# Patient Record
Sex: FEMALE | Race: WHITE | NOT HISPANIC OR LATINO | Employment: UNEMPLOYED | URBAN - NONMETROPOLITAN AREA
[De-identification: names, ages, dates, MRNs, and addresses within clinical notes are randomized per-mention and may not be internally consistent; named-entity substitution may affect disease eponyms.]

---

## 2017-04-25 LAB
EXTERNAL ABO GROUPING: NORMAL
EXTERNAL ANTIBODY SCREEN: NEGATIVE
EXTERNAL RH FACTOR: POSITIVE

## 2017-05-02 LAB
EXTERNAL CHLAMYDIA SCREEN: NEGATIVE
EXTERNAL GONORRHEA SCREEN: NEGATIVE
EXTERNAL HEPATITIS B SURFACE ANTIGEN: NEGATIVE
EXTERNAL HEPATITIS C AB: NEGATIVE
EXTERNAL HERPES PCR: NEGATIVE
EXTERNAL RUBELLA QUALITATIVE: NORMAL
EXTERNAL SYPHILIS RPR SCREEN: NORMAL
HIV1 P24 AG SERPL QL IA: NORMAL

## 2017-11-16 LAB — EXTERNAL GROUP B STREP ANTIGEN: NEGATIVE

## 2017-12-19 ENCOUNTER — APPOINTMENT (OUTPATIENT)
Dept: PREADMISSION TESTING | Facility: HOSPITAL | Age: 22
End: 2017-12-19

## 2017-12-19 VITALS
BODY MASS INDEX: 33.38 KG/M2 | RESPIRATION RATE: 18 BRPM | SYSTOLIC BLOOD PRESSURE: 125 MMHG | WEIGHT: 181.4 LBS | HEIGHT: 62 IN | OXYGEN SATURATION: 98 % | DIASTOLIC BLOOD PRESSURE: 81 MMHG | HEART RATE: 101 BPM

## 2017-12-19 DIAGNOSIS — O34.211 MATERNAL CARE DUE TO LOW TRANSVERSE UTERINE SCAR FROM PREVIOUS CESAREAN DELIVERY: ICD-10-CM

## 2017-12-19 DIAGNOSIS — O34.211 MATERNAL CARE DUE TO LOW TRANSVERSE UTERINE SCAR FROM PREVIOUS CESAREAN DELIVERY: Primary | ICD-10-CM

## 2017-12-19 LAB
ABO GROUP BLD: NORMAL
BASOPHILS # BLD AUTO: 0.02 10*3/MM3 (ref 0–0.2)
BASOPHILS NFR BLD AUTO: 0.2 % (ref 0–2)
BILIRUB UR QL STRIP: NEGATIVE
BLD GP AB SCN SERPL QL: NEGATIVE
CLARITY UR: CLEAR
COLOR UR: YELLOW
DEPRECATED RDW RBC AUTO: 38.4 FL (ref 40–54)
EOSINOPHIL # BLD AUTO: 0.2 10*3/MM3 (ref 0–0.7)
EOSINOPHIL NFR BLD AUTO: 2 % (ref 0–4)
ERYTHROCYTE [DISTWIDTH] IN BLOOD BY AUTOMATED COUNT: 13.4 % (ref 12–15)
GLUCOSE UR STRIP-MCNC: NEGATIVE MG/DL
HCT VFR BLD AUTO: 30.1 % (ref 37–47)
HGB BLD-MCNC: 9.9 G/DL (ref 12–16)
HGB UR QL STRIP.AUTO: NEGATIVE
IMM GRANULOCYTES # BLD: 0.11 10*3/MM3 (ref 0–0.03)
IMM GRANULOCYTES NFR BLD: 1.1 % (ref 0–5)
KETONES UR QL STRIP: NEGATIVE
LEUKOCYTE ESTERASE UR QL STRIP.AUTO: ABNORMAL
LYMPHOCYTES # BLD AUTO: 1.56 10*3/MM3 (ref 0.72–4.86)
LYMPHOCYTES NFR BLD AUTO: 15.7 % (ref 15–45)
MCH RBC QN AUTO: 26.4 PG (ref 28–32)
MCHC RBC AUTO-ENTMCNC: 32.9 G/DL (ref 33–36)
MCV RBC AUTO: 80.3 FL (ref 82–98)
MONOCYTES # BLD AUTO: 0.48 10*3/MM3 (ref 0.19–1.3)
MONOCYTES NFR BLD AUTO: 4.8 % (ref 4–12)
NEUTROPHILS # BLD AUTO: 7.54 10*3/MM3 (ref 1.87–8.4)
NEUTROPHILS NFR BLD AUTO: 76.2 % (ref 39–78)
NITRITE UR QL STRIP: NEGATIVE
NRBC BLD MANUAL-RTO: 0 /100 WBC (ref 0–0)
PH UR STRIP.AUTO: 8.5 [PH] (ref 5–8)
PLATELET # BLD AUTO: 274 10*3/MM3 (ref 130–400)
PMV BLD AUTO: 10.7 FL (ref 6–12)
PROT UR QL STRIP: NEGATIVE
RBC # BLD AUTO: 3.75 10*6/MM3 (ref 4.2–5.4)
RH BLD: POSITIVE
SP GR UR STRIP: 1.02 (ref 1–1.03)
UROBILINOGEN UR QL STRIP: ABNORMAL
WBC NRBC COR # BLD: 9.91 10*3/MM3 (ref 4.8–10.8)

## 2017-12-19 PROCEDURE — 86850 RBC ANTIBODY SCREEN: CPT | Performed by: OBSTETRICS & GYNECOLOGY

## 2017-12-19 PROCEDURE — 86901 BLOOD TYPING SEROLOGIC RH(D): CPT | Performed by: OBSTETRICS & GYNECOLOGY

## 2017-12-19 PROCEDURE — 81003 URINALYSIS AUTO W/O SCOPE: CPT | Performed by: OBSTETRICS & GYNECOLOGY

## 2017-12-19 PROCEDURE — 86900 BLOOD TYPING SEROLOGIC ABO: CPT | Performed by: OBSTETRICS & GYNECOLOGY

## 2017-12-19 PROCEDURE — 85025 COMPLETE CBC W/AUTO DIFF WBC: CPT | Performed by: OBSTETRICS & GYNECOLOGY

## 2017-12-19 PROCEDURE — 36415 COLL VENOUS BLD VENIPUNCTURE: CPT

## 2017-12-19 RX ORDER — METHYLERGONOVINE MALEATE 0.2 MG/ML
200 INJECTION INTRAVENOUS AS NEEDED
Status: CANCELLED | OUTPATIENT
Start: 2017-12-20

## 2017-12-19 RX ORDER — SODIUM CHLORIDE 0.9 % (FLUSH) 0.9 %
1-10 SYRINGE (ML) INJECTION AS NEEDED
Status: CANCELLED | OUTPATIENT
Start: 2017-12-20

## 2017-12-19 RX ORDER — DIPHENHYDRAMINE HCL 25 MG
25 CAPSULE ORAL EVERY 6 HOURS PRN
COMMUNITY
End: 2017-12-23 | Stop reason: HOSPADM

## 2017-12-19 RX ORDER — PROMETHAZINE HYDROCHLORIDE 25 MG/1
12.5 TABLET ORAL EVERY 6 HOURS PRN
Status: CANCELLED | OUTPATIENT
Start: 2017-12-20

## 2017-12-19 RX ORDER — ALBUTEROL SULFATE 90 UG/1
2 AEROSOL, METERED RESPIRATORY (INHALATION) EVERY 4 HOURS PRN
COMMUNITY

## 2017-12-19 RX ORDER — CARBOPROST TROMETHAMINE 250 UG/ML
250 INJECTION, SOLUTION INTRAMUSCULAR AS NEEDED
Status: CANCELLED | OUTPATIENT
Start: 2017-12-20

## 2017-12-19 RX ORDER — PROMETHAZINE HYDROCHLORIDE 25 MG/ML
12.5 INJECTION, SOLUTION INTRAMUSCULAR; INTRAVENOUS EVERY 6 HOURS PRN
Status: CANCELLED | OUTPATIENT
Start: 2017-12-20

## 2017-12-19 RX ORDER — CEFAZOLIN SODIUM 2 G/100ML
2 INJECTION, SOLUTION INTRAVENOUS ONCE
Status: CANCELLED | OUTPATIENT
Start: 2017-12-20

## 2017-12-19 RX ORDER — MISOPROSTOL 200 UG/1
800 TABLET ORAL AS NEEDED
Status: CANCELLED | OUTPATIENT
Start: 2017-12-20

## 2017-12-19 RX ORDER — OXYCODONE HYDROCHLORIDE AND ACETAMINOPHEN 5; 325 MG/1; MG/1
1 TABLET ORAL EVERY 4 HOURS PRN
Status: CANCELLED | OUTPATIENT
Start: 2017-12-20 | End: 2017-12-29

## 2017-12-19 RX ORDER — IBUPROFEN 800 MG/1
800 TABLET ORAL EVERY 8 HOURS PRN
Status: CANCELLED | OUTPATIENT
Start: 2017-12-20

## 2017-12-19 RX ORDER — PROMETHAZINE HYDROCHLORIDE 12.5 MG/1
12.5 SUPPOSITORY RECTAL EVERY 6 HOURS PRN
Status: CANCELLED | OUTPATIENT
Start: 2017-12-20

## 2017-12-19 NOTE — DISCHARGE INSTRUCTIONS
DAY OF SURGERY INSTRUCTIONS        YOUR SURGEON: dr kelly    PROCEDURE: *** SECTION REPEAT    DATE OF SURGERY: ***2017    ARRIVAL TIME: AS DIRECTED BY OFFICE    DAY OF SURGERY TAKE ONLY THESE MEDICATIONS: ***none        BEFORE YOU COME TO THE HOSPITAL  (Pre-op instructions)  • Do not eat, drink, smoke or chew gum after midnight the night before surgery.  This also includes no mints.  • Morning of surgery take only the medicines you have been instructed with a sip of water unless otherwise instructed  by your physician.  • Do not shave, wear makeup or dark nail polish.  • Remove all jewelry including rings.  • Leave anything you consider valuable at home.  • Leave your suitcase in the car until after your surgery.  • Bring the following with you if applicable:  o Picture ID and insurance, Medicare or Medicaid cards  o Co-pay/deductible required by insurance (cash, check, credit card)  o Copy of advance directive, living will or power-of- documents if not brought to PAT  o CPAP or BIPAP mask and tubing  o Relaxation aids (MP3 player, book, magazine)  • On the day of surgery check in at registration located at the main entrance of the hospital.       Outpatient Surgery Guidelines, Adult  Outpatient procedures are those for which the person having the procedure is allowed to go home the same day as the procedure. Various procedures are done on an outpatient basis. You should follow some general guidelines if you will be having an outpatient procedure.  LET YOUR HEALTH CARE PROVIDER KNOW ABOUT:  · Any allergies you have.  · All medicines you are taking, including vitamins, herbs, eye drops, creams, and over-the-counter medicines.  · Previous problems you or members of your family have had with the use of anesthetics.  · Any blood disorders you have.  · Previous surgeries you have had.  · Medical conditions you have.  RISKS AND COMPLICATIONS  Your health care provider will discuss possible risks  and complications with you before surgery. Common risks and complications include:    · Problems due to the use of anesthetics.  · Blood loss and replacement (does not apply to minor surgical procedures).  · Temporary increase in pain due to surgery.  · Uncorrected pain or problems that the surgery was meant to correct.  · Infection.  · New damage.  BEFORE THE PROCEDURE  · Ask your health care provider about changing or stopping your regular medicines. You may need to stop taking certain medicines in the days or weeks before the procedure.  · Stop smoking at least 2 weeks before surgery. This lowers your risk for complications during and after surgery. Ask your health care provider for help with this if needed.  · Eat your usual meals and a light supper the day before surgery. Continue fluid intake. Do not drink alcohol.  · Do not eat or drink after midnight the night before your surgery.   · Arrange for someone to take you home and to stay with you for 24 hours after the procedure. Medicine given for your procedure may affect your ability to drive or to care for yourself.  · Call your health care provider's office if you develop an illness or problem that may prevent you from safely having your procedure.  AFTER THE PROCEDURE  After surgery, you will be taken to a recovery area, where your progress will be monitored. If there are no complications, you will be allowed to go home when you are awake, stable, and taking fluids well. You may have numbness around the surgical site. Healing will take some time. You will have tenderness at the surgical site and may have some swelling and bruising. You may also have some nausea.  HOME CARE INSTRUCTIONS  · Do not drive for 24 hours, or as directed by your health care provider. Do not drive while taking prescription pain medicines.  · Do not drink alcohol for 24 hours.  · Do not make important decisions or sign legal documents for 24 hours.  · You may resume a normal diet and  activities as directed.  · Do not lift anything heavier than 10 pounds (4.5 kg) or play contact sports until your health care provider says it is okay.  · Change your bandages (dressings) as directed.  · Only take over-the-counter or prescription medicines as directed by your health care provider.  · Follow up with your health care provider as directed.  SEEK MEDICAL CARE IF:  · You have increased bleeding (more than a small spot) from the surgical site.  · You have redness, swelling, or increasing pain in the wound.  · You see pus coming from the wound.  · You have a fever.  · You notice a bad smell coming from the wound or dressing.  · You feel lightheaded or faint.  · You develop a rash.  · You have trouble breathing.  · You develop allergies.  MAKE SURE YOU:  · Understand these instructions.  · Will watch your condition.  · Will get help right away if you are not doing well or get worse.     This information is not intended to replace advice given to you by your health care provider. Make sure you discuss any questions you have with your health care provider.     Document Released: 09/12/2002 Document Revised: 05/03/2016 Document Reviewed: 05/22/2014  [employer] Interactive Patient Education ©2016 [employer]       Fall Prevention in Hospitals, Adult  As a hospital patient, your condition and the treatments you receive can increase your risk for falls. Some additional risk factors for falls in a hospital include:  · Being in an unfamiliar environment.  · Being on bed rest.  · Your surgery.  · Taking certain medicines.  · Your tubing requirements, such as intravenous (IV) therapy or catheters.  It is important that you learn how to decrease fall risks while at the hospital. Below are important tips that can help prevent falls.  SAFETY TIPS FOR PREVENTING FALLS  Talk about your risk of falling.  · Ask your health care provider why you are at risk for falling. Is it your medicine, illness, tubing placement, or  something else?  · Make a plan with your health care provider to keep you safe from falls.  · Ask your health care provider or pharmacist about side effects of your medicines. Some medicines can make you dizzy or affect your coordination.  Ask for help.  · Ask for help before getting out of bed. You may need to press your call button.  · Ask for assistance in getting safely to the toilet.  · Ask for a walker or cane to be put at your bedside. Ask that most of the side rails on your bed be placed up before your health care provider leaves the room.  · Ask family or friends to sit with you.  · Ask for things that are out of your reach, such as your glasses, hearing aids, telephone, bedside table, or call button.  Follow these tips to avoid falling:  · Stay lying or seated, rather than standing, while waiting for help.  · Wear rubber-soled slippers or shoes whenever you walk in the hospital.  · Avoid quick, sudden movements.  ¨ Change positions slowly.  ¨ Sit on the side of your bed before standing.  ¨ Stand up slowly and wait before you start to walk.  · Let your health care provider know if there is a spill on the floor.  · Pay careful attention to the medical equipment, electrical cords, and tubes around you.  · When you need help, use your call button by your bed or in the bathroom. Wait for one of your health care providers to help you.  · If you feel dizzy or unsure of your footing, return to bed and wait for assistance.  · Avoid being distracted by the TV, telephone, or another person in your room.  · Do not lean or support yourself on rolling objects, such as IV poles or bedside tables.     This information is not intended to replace advice given to you by your health care provider. Make sure you discuss any questions you have with your health care provider.     Document Released: 12/15/2001 Document Revised: 01/08/2016 Document Reviewed: 08/25/2013  [employer] Interactive Patient Education ©2016 [employer].       Surgical Site Infections FAQs  What is a Surgical Site Infection (SSI)?  A surgical site infection is an infection that occurs after surgery in the part of the body where the surgery took place. Most patients who have surgery do not develop an infection. However, infections develop in about 1 to 3 out of every 100 patients who have surgery.  Some of the common symptoms of a surgical site infection are:  · Redness and pain around the area where you had surgery  · Drainage of cloudy fluid from your surgical wound  · Fever  Can SSIs be treated?  Yes. Most surgical site infections can be treated with antibiotics. The antibiotic given to you depends on the bacteria (germs) causing the infection. Sometimes patients with SSIs also need another surgery to treat the infection.  What are some of the things that hospitals are doing to prevent SSIs?  To prevent SSIs, doctors, nurses, and other healthcare providers:  · Clean their hands and arms up to their elbows with an antiseptic agent just before the surgery.  · Clean their hands with soap and water or an alcohol-based hand rub before and after caring for each patient.  · May remove some of your hair immediately before your surgery using electric clippers if the hair is in the same area where the procedure will occur. They should not shave you with a razor.  · Wear special hair covers, masks, gowns, and gloves during surgery to keep the surgery area clean.  · Give you antibiotics before your surgery starts. In most cases, you should get antibiotics within 60 minutes before the surgery starts and the antibiotics should be stopped within 24 hours after surgery.  · Clean the skin at the site of your surgery with a special soap that kills germs.  What can I do to help prevent SSIs?  Before your surgery:  · Tell your doctor about other medical problems you may have. Health problems such as allergies, diabetes, and obesity could affect your surgery and your  treatment.  · Quit smoking. Patients who smoke get more infections. Talk to your doctor about how you can quit before your surgery.  · Do not shave near where you will have surgery. Shaving with a razor can irritate your skin and make it easier to develop an infection.  At the time of your surgery:  · Speak up if someone tries to shave you with a razor before surgery. Ask why you need to be shaved and talk with your surgeon if you have any concerns.  · Ask if you will get antibiotics before surgery.  After your surgery:  · Make sure that your healthcare providers clean their hands before examining you, either with soap and water or an alcohol-based hand rub.  · If you do not see your providers clean their hands, please ask them to do so.  · Family and friends who visit you should not touch the surgical wound or dressings.  · Family and friends should clean their hands with soap and water or an alcohol-based hand rub before and after visiting you. If you do not see them clean their hands, ask them to clean their hands.  What do I need to do when I go home from the hospital?  · Before you go home, your doctor or nurse should explain everything you need to know about taking care of your wound. Make sure you understand how to care for your wound before you leave the hospital.  · Always clean your hands before and after caring for your wound.  · Before you go home, make sure you know who to contact if you have questions or problems after you get home.  · If you have any symptoms of an infection, such as redness and pain at the surgery site, drainage, or fever, call your doctor immediately.  If you have additional questions, please ask your doctor or nurse.  Developed and co-sponsored by The Society for Healthcare Epidemiology of [place] (SHEA); Infectious Diseases Society of [place] (IDSA); American Hospital Association; Association for Professionals in Infection Control and Epidemiology (APIC); Centers for Disease  Control and Prevention (CDC); and The Joint Commission.     This information is not intended to replace advice given to you by your health care provider. Make sure you discuss any questions you have with your health care provider.     Document Released: 12/23/2014 Document Revised: 01/08/2016 Document Reviewed: 03/02/2016  [employer] Interactive Patient Education ©2016 [employer] Inc.       [facility]  CHG 4% Patient Instruction Sheet    Preparing the Skin Before Surgery  Preparing or “prepping” skin before surgery can reduce the risk of infection at the surgical site. To make the process easier,[facility] has chosen 4% Chlorhexidine Gluconate (CHG) antiseptic solution.   The steps below outline the prepping process and should be carefully followed.                                                                                                                                                      Prep the skin at the following time(s):                                                      We recommend you shower the night before surgery, and again the morning of surgery with the 4% CHG antiseptic solution using half of the bottle and a cloth each time.  Dress in clean clothes/sleepwear after showering.  See instructions below for application.          Do not apply any lotions or moisturizers.       Do not shave the area to be prepped for at least 2 days prior to surgery.    Clipping the hair may be done immediately prior to your surgery at the hospital    if needed.    Directions:  Thoroughly rinse your body with water.  Apply 4% CHG to a cloth and wash skin gently, paying special attention to the operative site.  Rinse again thoroughly.  Once you have begun using this product do not apply anything else to your skin. If itching or redness persists, rinse affected areas and discontinue use.    When using this product:  • Keep out of eyes, ears, and mouth.  • If solution should contact these areas, rinse out promptly  and thoroughly with water.  • For external use only.  • Do not use in genital area, on your face or head.      PATIENT/FAMILY/RESPONSIBLE PARTY VERBALIZES UNDERSTANDING OF ABOVE EDUCATION.  COPY OF PAIN SCALE GIVEN AND REVIEWED WITH VERBALIZED UNDERSTANDING.

## 2017-12-20 ENCOUNTER — ANESTHESIA EVENT (OUTPATIENT)
Dept: LABOR AND DELIVERY | Facility: HOSPITAL | Age: 22
End: 2017-12-20

## 2017-12-20 ENCOUNTER — ANESTHESIA (OUTPATIENT)
Dept: LABOR AND DELIVERY | Facility: HOSPITAL | Age: 22
End: 2017-12-20

## 2017-12-20 ENCOUNTER — HOSPITAL ENCOUNTER (INPATIENT)
Facility: HOSPITAL | Age: 22
LOS: 3 days | Discharge: HOME | End: 2017-12-23
Attending: OBSTETRICS & GYNECOLOGY | Admitting: OBSTETRICS & GYNECOLOGY

## 2017-12-20 DIAGNOSIS — Z3A.39 39 WEEKS GESTATION OF PREGNANCY: ICD-10-CM

## 2017-12-20 PROBLEM — O34.211 MATERNAL CARE DUE TO LOW TRANSVERSE UTERINE SCAR FROM PREVIOUS CESAREAN DELIVERY: Status: ACTIVE | Noted: 2017-12-20

## 2017-12-20 PROCEDURE — 25010000002 CEFEPIME PER 500 MG: Performed by: OBSTETRICS & GYNECOLOGY

## 2017-12-20 PROCEDURE — 25010000002 KETOROLAC TROMETHAMINE PER 15 MG: Performed by: NURSE ANESTHETIST, CERTIFIED REGISTERED

## 2017-12-20 PROCEDURE — C1765 ADHESION BARRIER: HCPCS | Performed by: OBSTETRICS & GYNECOLOGY

## 2017-12-20 PROCEDURE — 25010000002 DEXAMETHASONE PER 1 MG: Performed by: NURSE ANESTHETIST, CERTIFIED REGISTERED

## 2017-12-20 PROCEDURE — 25010000002 ONDANSETRON PER 1 MG: Performed by: OBSTETRICS & GYNECOLOGY

## 2017-12-20 PROCEDURE — 94799 UNLISTED PULMONARY SVC/PX: CPT

## 2017-12-20 PROCEDURE — 25010000002 ONDANSETRON PER 1 MG: Performed by: NURSE ANESTHETIST, CERTIFIED REGISTERED

## 2017-12-20 PROCEDURE — 94760 N-INVAS EAR/PLS OXIMETRY 1: CPT

## 2017-12-20 PROCEDURE — 25010000003 CEFAZOLIN PER 500 MG: Performed by: OBSTETRICS & GYNECOLOGY

## 2017-12-20 PROCEDURE — 88307 TISSUE EXAM BY PATHOLOGIST: CPT | Performed by: OBSTETRICS & GYNECOLOGY

## 2017-12-20 PROCEDURE — 25010000002 HYDROMORPHONE PER 4 MG: Performed by: NURSE ANESTHETIST, CERTIFIED REGISTERED

## 2017-12-20 PROCEDURE — 25010000002 KETOROLAC TROMETHAMINE PER 15 MG: Performed by: OBSTETRICS & GYNECOLOGY

## 2017-12-20 PROCEDURE — 51703 INSERT BLADDER CATH COMPLEX: CPT

## 2017-12-20 PROCEDURE — 25010000002 FUROSEMIDE PER 20 MG

## 2017-12-20 RX ORDER — PROMETHAZINE HYDROCHLORIDE 25 MG/ML
12.5 INJECTION, SOLUTION INTRAMUSCULAR; INTRAVENOUS EVERY 6 HOURS PRN
Status: DISCONTINUED | OUTPATIENT
Start: 2017-12-20 | End: 2017-12-20 | Stop reason: HOSPADM

## 2017-12-20 RX ORDER — SODIUM CHLORIDE, SODIUM LACTATE, POTASSIUM CHLORIDE, CALCIUM CHLORIDE 600; 310; 30; 20 MG/100ML; MG/100ML; MG/100ML; MG/100ML
125 INJECTION, SOLUTION INTRAVENOUS CONTINUOUS
Status: DISCONTINUED | OUTPATIENT
Start: 2017-12-20 | End: 2017-12-23 | Stop reason: HOSPADM

## 2017-12-20 RX ORDER — FUROSEMIDE 10 MG/ML
INJECTION INTRAMUSCULAR; INTRAVENOUS
Status: COMPLETED
Start: 2017-12-20 | End: 2017-12-20

## 2017-12-20 RX ORDER — BUPIVACAINE HYDROCHLORIDE 7.5 MG/ML
INJECTION, SOLUTION EPIDURAL; RETROBULBAR AS NEEDED
Status: DISCONTINUED | OUTPATIENT
Start: 2017-12-20 | End: 2017-12-20 | Stop reason: SURG

## 2017-12-20 RX ORDER — OXYCODONE AND ACETAMINOPHEN 7.5; 325 MG/1; MG/1
2 TABLET ORAL EVERY 4 HOURS PRN
Status: DISPENSED | OUTPATIENT
Start: 2017-12-20 | End: 2017-12-21

## 2017-12-20 RX ORDER — PRENATAL VIT/IRON FUM/FOLIC AC 27MG-0.8MG
1 TABLET ORAL DAILY
Status: DISCONTINUED | OUTPATIENT
Start: 2017-12-20 | End: 2017-12-23 | Stop reason: HOSPADM

## 2017-12-20 RX ORDER — SODIUM CHLORIDE 0.9 % (FLUSH) 0.9 %
1-10 SYRINGE (ML) INJECTION AS NEEDED
Status: DISCONTINUED | OUTPATIENT
Start: 2017-12-20 | End: 2017-12-20 | Stop reason: HOSPADM

## 2017-12-20 RX ORDER — BISACODYL 5 MG/1
10 TABLET, DELAYED RELEASE ORAL DAILY PRN
Status: DISCONTINUED | OUTPATIENT
Start: 2017-12-20 | End: 2017-12-23 | Stop reason: HOSPADM

## 2017-12-20 RX ORDER — SODIUM CHLORIDE 0.9 % (FLUSH) 0.9 %
1-10 SYRINGE (ML) INJECTION AS NEEDED
Status: DISCONTINUED | OUTPATIENT
Start: 2017-12-20 | End: 2017-12-23 | Stop reason: HOSPADM

## 2017-12-20 RX ORDER — OXYCODONE HYDROCHLORIDE AND ACETAMINOPHEN 5; 325 MG/1; MG/1
1 TABLET ORAL EVERY 6 HOURS PRN
Status: DISCONTINUED | OUTPATIENT
Start: 2017-12-21 | End: 2017-12-23 | Stop reason: HOSPADM

## 2017-12-20 RX ORDER — ONDANSETRON 4 MG/1
4 TABLET, FILM COATED ORAL EVERY 6 HOURS PRN
Status: DISCONTINUED | OUTPATIENT
Start: 2017-12-20 | End: 2017-12-23 | Stop reason: HOSPADM

## 2017-12-20 RX ORDER — OXYCODONE AND ACETAMINOPHEN 7.5; 325 MG/1; MG/1
1 TABLET ORAL EVERY 4 HOURS PRN
Status: ACTIVE | OUTPATIENT
Start: 2017-12-20 | End: 2017-12-21

## 2017-12-20 RX ORDER — OXYTOCIN 10 [USP'U]/ML
INJECTION, SOLUTION INTRAMUSCULAR; INTRAVENOUS AS NEEDED
Status: DISCONTINUED | OUTPATIENT
Start: 2017-12-20 | End: 2017-12-20 | Stop reason: SURG

## 2017-12-20 RX ORDER — SODIUM CHLORIDE, SODIUM LACTATE, POTASSIUM CHLORIDE, CALCIUM CHLORIDE 600; 310; 30; 20 MG/100ML; MG/100ML; MG/100ML; MG/100ML
INJECTION, SOLUTION INTRAVENOUS CONTINUOUS PRN
Status: DISCONTINUED | OUTPATIENT
Start: 2017-12-20 | End: 2017-12-20 | Stop reason: SURG

## 2017-12-20 RX ORDER — NALBUPHINE HCL 10 MG/ML
2.5 AMPUL (ML) INJECTION EVERY 4 HOURS PRN
Status: ACTIVE | OUTPATIENT
Start: 2017-12-20 | End: 2017-12-21

## 2017-12-20 RX ORDER — PHENYLEPHRINE HCL IN 0.9% NACL 0.8MG/10ML
SYRINGE (ML) INTRAVENOUS AS NEEDED
Status: DISCONTINUED | OUTPATIENT
Start: 2017-12-20 | End: 2017-12-20 | Stop reason: SURG

## 2017-12-20 RX ORDER — SENNA AND DOCUSATE SODIUM 50; 8.6 MG/1; MG/1
1 TABLET, FILM COATED ORAL NIGHTLY
Status: DISCONTINUED | OUTPATIENT
Start: 2017-12-20 | End: 2017-12-23 | Stop reason: HOSPADM

## 2017-12-20 RX ORDER — DOCUSATE SODIUM 100 MG/1
100 CAPSULE, LIQUID FILLED ORAL 2 TIMES DAILY PRN
Status: DISCONTINUED | OUTPATIENT
Start: 2017-12-20 | End: 2017-12-23 | Stop reason: HOSPADM

## 2017-12-20 RX ORDER — ONDANSETRON 4 MG/1
4 TABLET, FILM COATED ORAL EVERY 8 HOURS PRN
COMMUNITY
End: 2017-12-23 | Stop reason: HOSPADM

## 2017-12-20 RX ORDER — TRISODIUM CITRATE DIHYDRATE AND CITRIC ACID MONOHYDRATE 500; 334 MG/5ML; MG/5ML
15 SOLUTION ORAL ONCE
Status: COMPLETED | OUTPATIENT
Start: 2017-12-20 | End: 2017-12-20

## 2017-12-20 RX ORDER — ONDANSETRON 2 MG/ML
INJECTION INTRAMUSCULAR; INTRAVENOUS AS NEEDED
Status: DISCONTINUED | OUTPATIENT
Start: 2017-12-20 | End: 2017-12-20 | Stop reason: SURG

## 2017-12-20 RX ORDER — FERROUS SULFATE 325(65) MG
325 TABLET ORAL
COMMUNITY

## 2017-12-20 RX ORDER — OXYCODONE HYDROCHLORIDE AND ACETAMINOPHEN 5; 325 MG/1; MG/1
2 TABLET ORAL EVERY 6 HOURS PRN
Status: DISCONTINUED | OUTPATIENT
Start: 2017-12-21 | End: 2017-12-23 | Stop reason: HOSPADM

## 2017-12-20 RX ORDER — IBUPROFEN 800 MG/1
800 TABLET ORAL EVERY 8 HOURS PRN
Status: DISCONTINUED | OUTPATIENT
Start: 2017-12-21 | End: 2017-12-23 | Stop reason: HOSPADM

## 2017-12-20 RX ORDER — CARBOPROST TROMETHAMINE 250 UG/ML
250 INJECTION, SOLUTION INTRAMUSCULAR
Status: ACTIVE | OUTPATIENT
Start: 2017-12-20 | End: 2017-12-21

## 2017-12-20 RX ORDER — KETOROLAC TROMETHAMINE 30 MG/ML
INJECTION, SOLUTION INTRAMUSCULAR; INTRAVENOUS AS NEEDED
Status: DISCONTINUED | OUTPATIENT
Start: 2017-12-20 | End: 2017-12-20 | Stop reason: SURG

## 2017-12-20 RX ORDER — BISACODYL 10 MG
10 SUPPOSITORY, RECTAL RECTAL DAILY PRN
Status: DISCONTINUED | OUTPATIENT
Start: 2017-12-20 | End: 2017-12-23 | Stop reason: HOSPADM

## 2017-12-20 RX ORDER — IBUPROFEN 800 MG/1
800 TABLET ORAL EVERY 8 HOURS PRN
Status: DISCONTINUED | OUTPATIENT
Start: 2017-12-20 | End: 2017-12-20 | Stop reason: HOSPADM

## 2017-12-20 RX ORDER — FUROSEMIDE 10 MG/ML
10 INJECTION INTRAMUSCULAR; INTRAVENOUS ONCE
Status: COMPLETED | OUTPATIENT
Start: 2017-12-20 | End: 2017-12-20

## 2017-12-20 RX ORDER — OXYCODONE HYDROCHLORIDE AND ACETAMINOPHEN 5; 325 MG/1; MG/1
1 TABLET ORAL EVERY 4 HOURS PRN
Status: DISCONTINUED | OUTPATIENT
Start: 2017-12-20 | End: 2017-12-20 | Stop reason: HOSPADM

## 2017-12-20 RX ORDER — FAMOTIDINE 10 MG/ML
20 INJECTION, SOLUTION INTRAVENOUS ONCE AS NEEDED
Status: COMPLETED | OUTPATIENT
Start: 2017-12-20 | End: 2017-12-20

## 2017-12-20 RX ORDER — PROMETHAZINE HYDROCHLORIDE 12.5 MG/1
12.5 SUPPOSITORY RECTAL EVERY 6 HOURS PRN
Status: DISCONTINUED | OUTPATIENT
Start: 2017-12-20 | End: 2017-12-20 | Stop reason: HOSPADM

## 2017-12-20 RX ORDER — ONDANSETRON 2 MG/ML
4 INJECTION INTRAMUSCULAR; INTRAVENOUS EVERY 6 HOURS PRN
Status: DISCONTINUED | OUTPATIENT
Start: 2017-12-20 | End: 2017-12-20 | Stop reason: SDUPTHER

## 2017-12-20 RX ORDER — MISOPROSTOL 200 UG/1
800 TABLET ORAL AS NEEDED
Status: DISCONTINUED | OUTPATIENT
Start: 2017-12-20 | End: 2017-12-20 | Stop reason: HOSPADM

## 2017-12-20 RX ORDER — BUTORPHANOL TARTRATE 1 MG/ML
0.5 INJECTION, SOLUTION INTRAMUSCULAR; INTRAVENOUS EVERY 6 HOURS PRN
Status: ACTIVE | OUTPATIENT
Start: 2017-12-20 | End: 2017-12-21

## 2017-12-20 RX ORDER — DEXAMETHASONE SODIUM PHOSPHATE 4 MG/ML
INJECTION, SOLUTION INTRA-ARTICULAR; INTRALESIONAL; INTRAMUSCULAR; INTRAVENOUS; SOFT TISSUE AS NEEDED
Status: DISCONTINUED | OUTPATIENT
Start: 2017-12-20 | End: 2017-12-20 | Stop reason: SURG

## 2017-12-20 RX ORDER — METHYLERGONOVINE MALEATE 0.2 MG/ML
200 INJECTION INTRAVENOUS AS NEEDED
Status: DISCONTINUED | OUTPATIENT
Start: 2017-12-20 | End: 2017-12-20 | Stop reason: HOSPADM

## 2017-12-20 RX ORDER — ONDANSETRON 2 MG/ML
4 INJECTION INTRAMUSCULAR; INTRAVENOUS EVERY 6 HOURS PRN
Status: DISCONTINUED | OUTPATIENT
Start: 2017-12-20 | End: 2017-12-23 | Stop reason: HOSPADM

## 2017-12-20 RX ORDER — PROMETHAZINE HYDROCHLORIDE 25 MG/1
25 TABLET ORAL EVERY 6 HOURS PRN
Status: DISCONTINUED | OUTPATIENT
Start: 2017-12-20 | End: 2017-12-23 | Stop reason: HOSPADM

## 2017-12-20 RX ORDER — PROMETHAZINE HYDROCHLORIDE 25 MG/1
12.5 TABLET ORAL EVERY 6 HOURS PRN
Status: DISCONTINUED | OUTPATIENT
Start: 2017-12-20 | End: 2017-12-20 | Stop reason: HOSPADM

## 2017-12-20 RX ORDER — SODIUM CHLORIDE, SODIUM LACTATE, POTASSIUM CHLORIDE, CALCIUM CHLORIDE 600; 310; 30; 20 MG/100ML; MG/100ML; MG/100ML; MG/100ML
999 INJECTION, SOLUTION INTRAVENOUS CONTINUOUS
Status: DISCONTINUED | OUTPATIENT
Start: 2017-12-20 | End: 2017-12-20

## 2017-12-20 RX ORDER — KETOROLAC TROMETHAMINE 30 MG/ML
30 INJECTION, SOLUTION INTRAMUSCULAR; INTRAVENOUS EVERY 6 HOURS
Status: DISPENSED | OUTPATIENT
Start: 2017-12-20 | End: 2017-12-21

## 2017-12-20 RX ORDER — METHYLERGONOVINE MALEATE 0.2 MG/ML
200 INJECTION INTRAVENOUS
Status: ACTIVE | OUTPATIENT
Start: 2017-12-20 | End: 2017-12-21

## 2017-12-20 RX ORDER — ONDANSETRON 4 MG/1
4 TABLET, ORALLY DISINTEGRATING ORAL EVERY 6 HOURS PRN
Status: DISCONTINUED | OUTPATIENT
Start: 2017-12-20 | End: 2017-12-23 | Stop reason: HOSPADM

## 2017-12-20 RX ORDER — MISOPROSTOL 200 UG/1
800 TABLET ORAL ONCE AS NEEDED
Status: ACTIVE | OUTPATIENT
Start: 2017-12-20 | End: 2017-12-21

## 2017-12-20 RX ORDER — PROMETHAZINE HYDROCHLORIDE 25 MG/ML
12.5 INJECTION, SOLUTION INTRAMUSCULAR; INTRAVENOUS EVERY 6 HOURS PRN
Status: DISCONTINUED | OUTPATIENT
Start: 2017-12-20 | End: 2017-12-23 | Stop reason: HOSPADM

## 2017-12-20 RX ORDER — CARBOPROST TROMETHAMINE 250 UG/ML
250 INJECTION, SOLUTION INTRAMUSCULAR AS NEEDED
Status: DISCONTINUED | OUTPATIENT
Start: 2017-12-20 | End: 2017-12-20 | Stop reason: HOSPADM

## 2017-12-20 RX ORDER — NALOXONE HCL 0.4 MG/ML
0.4 VIAL (ML) INJECTION
Status: ACTIVE | OUTPATIENT
Start: 2017-12-20 | End: 2017-12-21

## 2017-12-20 RX ORDER — PROMETHAZINE HYDROCHLORIDE 12.5 MG/1
12.5 SUPPOSITORY RECTAL EVERY 6 HOURS PRN
Status: DISCONTINUED | OUTPATIENT
Start: 2017-12-20 | End: 2017-12-23 | Stop reason: HOSPADM

## 2017-12-20 RX ADMIN — FUROSEMIDE 10 MG: 10 INJECTION INTRAMUSCULAR; INTRAVENOUS at 12:49

## 2017-12-20 RX ADMIN — SODIUM CHLORIDE, POTASSIUM CHLORIDE, SODIUM LACTATE AND CALCIUM CHLORIDE 999 ML/HR: 600; 310; 30; 20 INJECTION, SOLUTION INTRAVENOUS at 11:29

## 2017-12-20 RX ADMIN — DEXAMETHASONE SODIUM PHOSPHATE 8 MG: 4 INJECTION, SOLUTION INTRAMUSCULAR; INTRAVENOUS at 07:50

## 2017-12-20 RX ADMIN — HYDROMORPHONE HYDROCHLORIDE 100 MCG: 1 INJECTION, SOLUTION INTRAMUSCULAR; INTRAVENOUS; SUBCUTANEOUS at 07:55

## 2017-12-20 RX ADMIN — SODIUM CHLORIDE, POTASSIUM CHLORIDE, SODIUM LACTATE AND CALCIUM CHLORIDE: 600; 310; 30; 20 INJECTION, SOLUTION INTRAVENOUS at 07:22

## 2017-12-20 RX ADMIN — CEFAZOLIN SODIUM 2 G: 2 SOLUTION INTRAVENOUS at 07:47

## 2017-12-20 RX ADMIN — SODIUM CHLORIDE, POTASSIUM CHLORIDE, SODIUM LACTATE AND CALCIUM CHLORIDE: 600; 310; 30; 20 INJECTION, SOLUTION INTRAVENOUS at 08:17

## 2017-12-20 RX ADMIN — BUPIVACAINE HYDROCHLORIDE 1.5 ML: 7.5 INJECTION, SOLUTION EPIDURAL; RETROBULBAR at 07:55

## 2017-12-20 RX ADMIN — HYDROMORPHONE HYDROCHLORIDE 400 MCG: 1 INJECTION, SOLUTION INTRAMUSCULAR; INTRAVENOUS; SUBCUTANEOUS at 08:30

## 2017-12-20 RX ADMIN — CEFEPIME HYDROCHLORIDE 2 G: 2 INJECTION, POWDER, FOR SOLUTION INTRAVENOUS at 21:00

## 2017-12-20 RX ADMIN — OXYTOCIN 30 UNITS: 10 INJECTION INTRAVENOUS at 08:17

## 2017-12-20 RX ADMIN — Medication 80 MCG: at 08:09

## 2017-12-20 RX ADMIN — FAMOTIDINE 20 MG: 10 INJECTION INTRAVENOUS at 07:39

## 2017-12-20 RX ADMIN — ONDANSETRON 4 MG: 2 INJECTION, SOLUTION INTRAMUSCULAR; INTRAVENOUS at 17:49

## 2017-12-20 RX ADMIN — SODIUM CITRATE AND CITRIC ACID MONOHYDRATE 15 ML: 500; 334 SOLUTION ORAL at 07:38

## 2017-12-20 RX ADMIN — EPHEDRINE SULFATE 10 MG: 50 INJECTION INTRAMUSCULAR; INTRAVENOUS; SUBCUTANEOUS at 08:00

## 2017-12-20 RX ADMIN — ONDANSETRON HYDROCHLORIDE 4 MG: 2 SOLUTION INTRAMUSCULAR; INTRAVENOUS at 07:50

## 2017-12-20 RX ADMIN — SODIUM CHLORIDE, POTASSIUM CHLORIDE, SODIUM LACTATE AND CALCIUM CHLORIDE 1000 ML: 600; 310; 30; 20 INJECTION, SOLUTION INTRAVENOUS at 05:45

## 2017-12-20 RX ADMIN — SODIUM CHLORIDE, POTASSIUM CHLORIDE, SODIUM LACTATE AND CALCIUM CHLORIDE 1000 ML: 600; 310; 30; 20 INJECTION, SOLUTION INTRAVENOUS at 06:40

## 2017-12-20 RX ADMIN — FUROSEMIDE 10 MG: 10 INJECTION, SOLUTION INTRAMUSCULAR; INTRAVENOUS at 12:49

## 2017-12-20 RX ADMIN — CEFEPIME HYDROCHLORIDE 2 G: 2 INJECTION, POWDER, FOR SOLUTION INTRAVENOUS at 11:12

## 2017-12-20 RX ADMIN — SODIUM CHLORIDE, POTASSIUM CHLORIDE, SODIUM LACTATE AND CALCIUM CHLORIDE 999 ML/HR: 600; 310; 30; 20 INJECTION, SOLUTION INTRAVENOUS at 13:32

## 2017-12-20 RX ADMIN — OXYCODONE HYDROCHLORIDE AND ACETAMINOPHEN 2 TABLET: 7.5; 325 TABLET ORAL at 21:26

## 2017-12-20 RX ADMIN — HYDROMORPHONE HYDROCHLORIDE 500 MCG: 1 INJECTION, SOLUTION INTRAMUSCULAR; INTRAVENOUS; SUBCUTANEOUS at 08:41

## 2017-12-20 RX ADMIN — KETOROLAC TROMETHAMINE 30 MG: 30 INJECTION, SOLUTION INTRAMUSCULAR at 08:47

## 2017-12-20 RX ADMIN — KETOROLAC TROMETHAMINE 30 MG: 30 INJECTION, SOLUTION INTRAMUSCULAR at 21:00

## 2017-12-20 NOTE — PAYOR COMM NOTE
"[facility]    LUIS ENRIQUE,   [phone]  OR   FAX   [phone]    REF: [id number]    Ginna Gan (22 y.o. Female)     Date of Birth Social Security Number Address Home Phone MRN    1995  [address]  [facility] [address] [phone] [id number]    Confucianism Marital Status          None Significant Other       Admission Date Admission Type Admitting Provider Attending Provider Department, Room/Bed    12/20/17 Elective Lisa Gaspar MD Mueller, Lisa Barber MD [facility] LABOR DELIVERY, LPA2/1    Discharge Date Discharge Disposition Discharge Destination                      Attending Provider: Lisa Gaspar MD     Allergies:  No Known Allergies    Isolation:  None   Infection:  None   Code Status:  FULL    Ht:  157.5 cm (62\")   Wt:  80 kg (176 lb 6.4 oz)    Admission Cmt:  None   Principal Problem:  None                Active Insurance as of 12/20/2017     Primary Coverage     Payor Plan Insurance Group Employer/Plan Group    [facility] BLUE CROSS [facility] BLUE CROSS BLUE [facility] PPO [id number]     Payor Plan Address Payor Plan Phone Number Effective From Effective To    [address] [phone] 7/1/2016     [place] [address]       Subscriber Name Subscriber Birth Date Member ID       PHILL GAN 4/18/1962 [id number]           Secondary Coverage     Payor Plan Insurance Group Employer/Plan Group    [employer] [place] [employer] MEDICAID      Payor Plan Address Payor Plan Phone Number Effective From Effective To    [address] [address] [phone] 12/5/2017     [place] [address]       Subscriber Name Subscriber Birth Date Member ID       GINNA GAN 1995 [id number]                 Emergency Contacts      (Rel.) Home Phone Work Phone Mobile Phone    Kin Gan (Spouse) [phone] -- --        Inpatient Services prior Authorization   Fax [phone]  Phone [phone] To: [employer]   REF: [id number]   CHECK ONE OF THE FOLLOWING     "    £Skilled Nursing Facility     £ Rehab     £ LTACH     £ Hospice   X  INPATIENT MATERNITY   CHOOSE THE APPROPRIATE REQUEST TYPE        Standard request         Requests for prior authorization (with supporting clinical information and documentation) should be sent to the Health Plan fourteen (14) days prior to the date the requested services will be performed. If a response has not been received within two (2) business days, call [phone] to confirm your request has been received.   ? Expedited Request  By signing below I certify that applying the standard review time frame may seriously jeopardize the life or health of the member or the member’s ability to regain maximum function.   Physician Signature Validating Expedited Request:                                                                     Date:    MEMBER INFORMATION   Member  Last Name:   MAY                     First Name:   GINNA WALTER   [birth year]   Member ID#  [id number]  Other Insurance:  [id number]   REQUESTING PHYSICIAN INFORMATION   Provider Last Name:    MONTANEZ First Name:  DANA Provider NPI : [id number]   Type: Specialist  Specialty:  OB/GYN Participating:   Phone Number:  [phone] Fax Number:  [phone] Office Contact:   CHASITY ANTOINE      Address: [address] [place] [address]  Zip Code: [address]   FACILITY INFORMATION   Type: X URGENT MATERNITY Medical Record Number: [id number]   Facility Name: [facility]   Facility ID [id number] Tax [phone]   Phone Number: [phone] Fax Number: [phone] Hospital Contact:  SERGIO    [phone]   Address: [address] [place] [address] City: [place] State: [place] Zip: [address]   REQUESTED SERVICE(S)   Planned Date(s) of Service OR Date of Admission:   From:  To: Or Requested length of stay____days   CPT/HCPCS Code(s): 38596 Procedure(s):  C SECTION   Primary ICD-10 Code(s)    O82    Please attach documentation to support medical necessity. This includes: H&P, progress notes,  lab results and treatment plans.  17 AT 0816  REPEAT   MALE  APGAR 8/9  WEIGHT 124.5 OZ   G-2 P-1  EDC 17  39/6 GESTATION AGE   If this request is for out-of-network services, please provide an explanation below.           Vital Signs (last 24 hours)        0700  -   0659  0700  -   1112   Most Recent    Temp (°F)   98.6    96.5 -  97.6     97.6 (36.4)    Heart Rate 101 -  109    86 -  109     105    Resp   18    16 -  18     18    /81 -  130/83    (!)75/31 -  129/69     129/69    SpO2 (%)   98    96 -  99     98

## 2017-12-20 NOTE — OP NOTE
Section Procedure Note    Indications: Previous     Pre-operative Diagnosis: 39 week 6 day pregnancy.    Post-operative Diagnosis: same    Planned Procedure:  Repeat     Surgeon: iLsa Gaspar MD     Assistants: None    Anesthesia: Spinal anesthesia    ASA Class: 2      Procedure Details     The risks, benefits, indications and alternatives of the procedure were reviewed with the patient and informed consent was obtained. The patient was taken to the Operating Room with an IV running. She was placed in the dorsal supine position, with a leftward tilt, then prepped and draped in the usual sterile fashion. Spinal anesthesia was then administrated without difficulty. A Pfannenstiel skin incision was made approximately 2cm above the symphysis pubis and extended down sharply to the rectus fascia. The fascia was then nicked in the midline and extended laterally. The muscles of the anterior abdominal wall were . The fascia was then grasped and the fascial incision extended laterally via superior and inferior traction. The peritoneum was next identified, and entered bluntly with the digits. The peritoneal incision was also extended via traction, taking care to note the position of the bladder. Next a bladder blade was inserted. A vesicouterine incision was made with the metzembaum scissors, and a bladder flap gently created. The bladder blade was then reinserted. Next, the uterine incision was made with the scapel, in transverse, elliptical fashion. The uterine incision was extended laterally via traction. The bladder blade was then removed, and the fetal vertex delivered through the uterine incision without difficulty. Nares and mouth were suctioned on the sterile field. The remainder of the infant was then delivered, and infant passed to the awaiting respiratory therapist. Next, the placenta was delivered manually, taking care to remove all membranes. The uterus was then  exteriorized, and cleared of all clot and debris. The uterine incision was closed with 0-Vicryl in running, locking fashion. Excellent hemostasis was noted. Copious irrigation of the posterior cul-de-sac was performed with warm, sterile saline. The uterus was returned to the abdominal cavity, and the gutters cleared of all clot and debris. Interceed was placed over the uterine incision and over the body of the uterus to prevent future adhesions. The fascia was then closed with 0-Vicryl in running fashion. Next interceed was layered over the fascia to prevent future adhesions. The skin was subsequently closed with stratifix suture in 2 layers. The skin was then dressed with a mepilex gauze.    Findings:  VMI  In cephalic, OA position, clear,+ nuchal cord. Manual extraction of  Normal placenta and cord. Apgars         APGARS  One minute Five minutes Ten minutes Fifteen minutes Twenty minutes   Skin color: 0   1             Heart rate: 2   2             Grimace: 2   2              Muscle tone: 2   2              Breathin   2              Totals: 8   9              , Weight: 3530 g (7 lb 12.5 oz)  Length: 19.5  in. NICU/Nursery Present.    Estimated Blood Loss:  600ml           Drains: 100ml UOP            Total IV Fluids: 1500ml           Specimens: Placenta, cord blood           Implants: Interceed x 3           Complications:  None; patient tolerated the procedure well.           Disposition: PACU - hemodynamically stable.           Condition: stable    Attending Attestation: I performed the procedure.    Lisa Gaspar MD  2017  5:09 PM

## 2017-12-20 NOTE — PLAN OF CARE
Problem: Patient Care Overview (Adult)  Goal: Plan of Care Review  Outcome: Ongoing (interventions implemented as appropriate)   17 1020   Coping/Psychosocial Response Interventions   Plan Of Care Reviewed With patient;spouse   Patient Care Overview   Progress improving   Outcome Evaluation   Outcome Summary/Follow up Plan delivered viable male via c/section, abd dressing dry and intact. breastfeeding infant without difficulty     Goal: Adult Individualization and Mutuality  Outcome: Ongoing (interventions implemented as appropriate)    Goal: Discharge Needs Assessment  Outcome: Ongoing (interventions implemented as appropriate)   17 1020   Discharge Needs Assessment   Concerns To Be Addressed no discharge needs identified       Problem:  Delivery (Adult,Obstetrics,Pediatric)  Goal: Signs and Symptoms of Listed Potential Problems Will be Absent or Manageable ( Delivery)  Outcome: Outcome(s) achieved Date Met: 17  Delivered viable male via c/section    Problem: Fall Risk  (Adult,Obstetrics,Pediatric)  Goal: Identify Related Risk Factors and Signs and Symptoms  Outcome: Ongoing (interventions implemented as appropriate)  Pt on bedrest, holding infant in bed, nurse at bedside  Goal: Absence of Maternal Fall  Outcome: Ongoing (interventions implemented as appropriate)  Pt on bedrest  Goal: Absence of [place] Fall/Drop  Outcome: Ongoing (interventions implemented as appropriate)  Pt remains on bedrest    Problem: Anesthesia/Analgesia, Neuraxial (Adult)  Goal: Signs and Symptoms of Listed Potential Problems Will be Absent or Manageable (Anesthesia/Analgesia, Neuraxial)  Outcome: Ongoing (interventions implemented as appropriate)   17 1020   Anesthesia/Analgesia, Neuraxial   Problems Assessed (Neuraxial Anesthesia/Analgesia) all   Problems Present (Neuraxial Anesthesia/Analgesia) none

## 2017-12-20 NOTE — LACTATION NOTE
"Infant male, Reilly, delivered via repeat c/s at 0816 today at 39 weeks, 6 days. Infant BF'ed at 0920 and 1415.     Hx:  Mother exclusively BF'ed 3 yr old son for 18 months. She prefers not to use formula with this baby.    Assisted with latch at 1415 feeding. Infant skin to skin with mom, alert, calm, eyes open. Deep latch attained with minimal assistance. Large colostrum drops noted to nipple prior to latch. Deep jaw dropping suckles and swallows noted while at breast. Mother reports infant nursed well for 15 mins then slept at breast. Demonstrated baby \"sit ups\" to stimulate infant to feed from second breast. Mother able to perform independently after demo. Reilly pursed lips, gazed at mother, showed no feeding cues for continued feeding on second side. Per mother's request,placed infant skin to skin under mother's gown. , Kin, feeding mother as she rests/holds infant. BFing log explained to both with first feed (on postpartum unit) entered for them. Urged frequent feeds from both breasts each time. Mother reports older son's pattern was to nurse from one breast only per session. Much praise given.  "

## 2017-12-20 NOTE — ANESTHESIA PROCEDURE NOTES
Spinal Block    Patient location during procedure: OB  Indication:at surgeon's request and post-op pain management  Performed By  CRNA: TOOTIE MENJIVAR  Preanesthetic Checklist  Completed: patient identified, site marked, surgical consent, pre-op evaluation, timeout performed, IV checked, risks and benefits discussed and monitors and equipment checked  Spinal Block Prep:  Patient Position:sitting  Sterile Tech:cap, gloves, mask and sterile barriers  Prep:DuraPrep  Patient Monitoring:blood pressure monitoring, continuous pulse oximetry and EKG  Spinal Block Procedure  Approach:midline  Guidance:landmark technique and palpation technique  Location:L5-S1  Needle Type:Pencan  Needle Gauge:25 G  Placement of Spinal needle event:cerebrospinal fluid aspirated  Paresthesia: no  Fluid Appearance:clear  Post Assessment  Patient Tolerance:patient tolerated the procedure well with no apparent complications  Complications no

## 2017-12-20 NOTE — NURSING NOTE
Pt c/o her hearing in her left ear being muffled.  States she can hear out of it but not as well as the right.  V/S wnl, no other c/o's.  Pt states she has had a head cold recently. VAN Urena CRNA notified.  HOB elevated per CRNA's request.

## 2017-12-20 NOTE — ANESTHESIA PREPROCEDURE EVALUATION
Anesthesia Evaluation     NPO Solid Status: > 8 hours  NPO Liquid Status: > 8 hours     Airway   Mallampati: II  no difficulty expected  Dental - normal exam     Pulmonary - normal exam   (+) asthma,   Cardiovascular - negative cardio ROS and normal exam        Neuro/Psych  GI/Hepatic/Renal/Endo - negative ROS     Musculoskeletal (-) negative ROS    Abdominal  - normal exam   Substance History - negative use     OB/GYN    (+) Pregnant,         Other - negative ROS                                       Anesthesia Plan    ASA 2     spinal     Anesthetic plan and risks discussed with patient.

## 2017-12-20 NOTE — PLAN OF CARE
Problem: Patient Care Overview (Adult)  Goal: Plan of Care Review  Outcome: Ongoing (interventions implemented as appropriate)   17 1650   Coping/Psychosocial Response Interventions   Plan Of Care Reviewed With patient   Patient Care Overview   Progress improving   Outcome Evaluation   Outcome Summary/Follow up Plan FFUUML, scant lochia, f/c to BSD, ALT incision with mepilex dressing, CDI, pain level well controlled with spinal, breastfeeding well     Goal: Adult Individualization and Mutuality  Outcome: Ongoing (interventions implemented as appropriate)    Goal: Discharge Needs Assessment  Outcome: Ongoing (interventions implemented as appropriate)      Problem: Fall Risk  (Adult,Obstetrics,Pediatric)  Goal: Identify Related Risk Factors and Signs and Symptoms  Outcome: Ongoing (interventions implemented as appropriate)    Goal: Absence of Maternal Fall  Outcome: Ongoing (interventions implemented as appropriate)    Goal: Absence of  Fall/Drop  Outcome: Ongoing (interventions implemented as appropriate)      Problem: Anesthesia/Analgesia, Neuraxial (Adult)  Goal: Signs and Symptoms of Listed Potential Problems Will be Absent or Manageable (Anesthesia/Analgesia, Neuraxial)  Outcome: Ongoing (interventions implemented as appropriate)      Problem: Postpartum ( Delivery) (Adult)  Goal: Signs and Symptoms of Listed Potential Problems Will be Absent or Manageable (Postpartum)  Outcome: Ongoing (interventions implemented as appropriate)

## 2017-12-21 LAB
BASOPHILS # BLD AUTO: 0.02 10*3/MM3 (ref 0–0.2)
BASOPHILS NFR BLD AUTO: 0.1 % (ref 0–2)
DEPRECATED RDW RBC AUTO: 39 FL (ref 40–54)
EOSINOPHIL # BLD AUTO: 0.07 10*3/MM3 (ref 0–0.7)
EOSINOPHIL NFR BLD AUTO: 0.5 % (ref 0–4)
ERYTHROCYTE [DISTWIDTH] IN BLOOD BY AUTOMATED COUNT: 13.5 % (ref 12–15)
HCT VFR BLD AUTO: 24.6 % (ref 37–47)
HGB BLD-MCNC: 8.1 G/DL (ref 12–16)
IMM GRANULOCYTES # BLD: 0.14 10*3/MM3 (ref 0–0.03)
IMM GRANULOCYTES NFR BLD: 1 % (ref 0–5)
LYMPHOCYTES # BLD AUTO: 3.32 10*3/MM3 (ref 0.72–4.86)
LYMPHOCYTES NFR BLD AUTO: 23.4 % (ref 15–45)
MCH RBC QN AUTO: 26.4 PG (ref 28–32)
MCHC RBC AUTO-ENTMCNC: 32.9 G/DL (ref 33–36)
MCV RBC AUTO: 80.1 FL (ref 82–98)
MONOCYTES # BLD AUTO: 0.8 10*3/MM3 (ref 0.19–1.3)
MONOCYTES NFR BLD AUTO: 5.6 % (ref 4–12)
NEUTROPHILS # BLD AUTO: 9.83 10*3/MM3 (ref 1.87–8.4)
NEUTROPHILS NFR BLD AUTO: 69.4 % (ref 39–78)
NRBC BLD MANUAL-RTO: 0 /100 WBC (ref 0–0)
PLATELET # BLD AUTO: 271 10*3/MM3 (ref 130–400)
PMV BLD AUTO: 10.8 FL (ref 6–12)
RBC # BLD AUTO: 3.07 10*6/MM3 (ref 4.2–5.4)
WBC NRBC COR # BLD: 14.18 10*3/MM3 (ref 4.8–10.8)

## 2017-12-21 PROCEDURE — 94799 UNLISTED PULMONARY SVC/PX: CPT

## 2017-12-21 PROCEDURE — 25010000002 ONDANSETRON PER 1 MG: Performed by: OBSTETRICS & GYNECOLOGY

## 2017-12-21 PROCEDURE — 85025 COMPLETE CBC W/AUTO DIFF WBC: CPT | Performed by: OBSTETRICS & GYNECOLOGY

## 2017-12-21 PROCEDURE — 25010000002 CEFEPIME PER 500 MG: Performed by: OBSTETRICS & GYNECOLOGY

## 2017-12-21 PROCEDURE — 25010000002 KETOROLAC TROMETHAMINE PER 15 MG: Performed by: OBSTETRICS & GYNECOLOGY

## 2017-12-21 RX ADMIN — IBUPROFEN 800 MG: 800 TABLET, FILM COATED ORAL at 15:11

## 2017-12-21 RX ADMIN — Medication 10 ML: at 15:16

## 2017-12-21 RX ADMIN — IBUPROFEN 800 MG: 800 TABLET, FILM COATED ORAL at 23:30

## 2017-12-21 RX ADMIN — KETOROLAC TROMETHAMINE 30 MG: 30 INJECTION, SOLUTION INTRAMUSCULAR at 03:12

## 2017-12-21 RX ADMIN — CEFEPIME HYDROCHLORIDE 2 G: 2 INJECTION, POWDER, FOR SOLUTION INTRAVENOUS at 05:00

## 2017-12-21 RX ADMIN — PRENATAL VIT W/ FE FUMARATE-FA TAB 27-0.8 MG 1 TABLET: 27-0.8 TAB at 10:54

## 2017-12-21 RX ADMIN — DOCUSATE SODIUM -SENNOSIDES 1 TABLET: 50; 8.6 TABLET, COATED ORAL at 09:13

## 2017-12-21 RX ADMIN — KETOROLAC TROMETHAMINE 30 MG: 30 INJECTION, SOLUTION INTRAMUSCULAR at 09:13

## 2017-12-21 RX ADMIN — SODIUM CHLORIDE, POTASSIUM CHLORIDE, SODIUM LACTATE AND CALCIUM CHLORIDE 125 ML/HR: 600; 310; 30; 20 INJECTION, SOLUTION INTRAVENOUS at 02:45

## 2017-12-21 RX ADMIN — OXYCODONE HYDROCHLORIDE AND ACETAMINOPHEN 2 TABLET: 7.5; 325 TABLET ORAL at 07:09

## 2017-12-21 RX ADMIN — DOCUSATE SODIUM 100 MG: 100 CAPSULE, LIQUID FILLED ORAL at 17:48

## 2017-12-21 RX ADMIN — POLYETHYLENE GLYCOL (3350) 17 G: 17 POWDER, FOR SOLUTION ORAL at 09:13

## 2017-12-21 RX ADMIN — OXYCODONE HYDROCHLORIDE AND ACETAMINOPHEN 2 TABLET: 5; 325 TABLET ORAL at 17:48

## 2017-12-21 RX ADMIN — ONDANSETRON 4 MG: 2 INJECTION, SOLUTION INTRAMUSCULAR; INTRAVENOUS at 03:11

## 2017-12-21 RX ADMIN — CEFEPIME HYDROCHLORIDE 2 G: 2 INJECTION, POWDER, FOR SOLUTION INTRAVENOUS at 11:29

## 2017-12-21 RX ADMIN — OXYCODONE HYDROCHLORIDE AND ACETAMINOPHEN 2 TABLET: 7.5; 325 TABLET ORAL at 11:29

## 2017-12-21 RX ADMIN — OXYCODONE HYDROCHLORIDE AND ACETAMINOPHEN 2 TABLET: 5; 325 TABLET ORAL at 23:52

## 2017-12-21 RX ADMIN — OXYCODONE HYDROCHLORIDE AND ACETAMINOPHEN 2 TABLET: 7.5; 325 TABLET ORAL at 02:45

## 2017-12-21 RX ADMIN — ONDANSETRON 4 MG: 4 TABLET, FILM COATED ORAL at 09:13

## 2017-12-21 NOTE — LACTATION NOTE
This note was copied from a baby's chart.  Follow up visit, Mom pumping, flange size is correct, lanolin cream given/reviewed use, provided mom with encouragement and support.

## 2017-12-21 NOTE — PLAN OF CARE
Problem: Patient Care Overview (Adult)  Goal: Plan of Care Review  Outcome: Ongoing (interventions implemented as appropriate)   17 0625   Coping/Psychosocial Response Interventions   Plan Of Care Reviewed With patient   Patient Care Overview   Progress improving   Outcome Evaluation   Outcome Summary/Follow up Plan lorena carter'd, due to void, pain well controlled with percocet PRN, breastfeeding & bonding with infant     Goal: Adult Individualization and Mutuality  Outcome: Ongoing (interventions implemented as appropriate)    Goal: Discharge Needs Assessment  Outcome: Ongoing (interventions implemented as appropriate)      Problem: Anesthesia/Analgesia, Neuraxial (Adult)  Goal: Signs and Symptoms of Listed Potential Problems Will be Absent or Manageable (Anesthesia/Analgesia, Neuraxial)  Outcome: Ongoing (interventions implemented as appropriate)      Problem: Postpartum ( Delivery) (Adult)  Goal: Signs and Symptoms of Listed Potential Problems Will be Absent or Manageable (Postpartum)  Outcome: Ongoing (interventions implemented as appropriate)

## 2017-12-21 NOTE — PROGRESS NOTES
"[facility]   Section Postpartum Delivery Progress Note    Subjective   Postpartum Day 1:  Delivery    The patient feels well.  Her pain is well controlled with prescribed pain medications.   She is ambulating well.  Patient describes her bleeding as moderate lochia.    Breastfeeding: without difficulty.    Objective     Vital Signs Range for the last 24 hours  Temperature: Temp:  [97 °F (36.1 °C)-98.2 °F (36.8 °C)] 97.8 °F (36.6 °C)   Temp Source: Temp src: Temporal Artery    BP: BP: (101-124)/(50-74) 119/68   Pulse: Heart Rate:  [] 97   Respirations: Resp:  [16-18] 16   SPO2: SpO2:  [97 %-100 %] 98 %   O2 Amount (l/min):     O2 Devices O2 Device: room air   Weight:       Admit Height:  Height: 157.5 cm (62\")      Physical Exam:  General:  no acute distresss.  Abdomen: Fundus: appropriate, firm, non tender  Extremities: normal, atraumatic, no cyanosis, and trace edema.   Incision: clean, dry & intact    Lab results reviewed:  Yes   Rubella:  No results found for: RUBELLAIGGIN Nurse Transcribed from prenatal record --  No components found for: EXTRUBELQUAL  Rh Status:    RH type   Date Value Ref Range Status   2017 Positive  Final     Immunizations: There is no immunization history for the selected administration types on file for this patient.    Assessment/Plan     Principal Problem:     delivery delivered  Active Problems:    Maternal care due to low transverse uterine scar from previous  delivery      Sherine Evans May is Day 1  post-partum  , Low Transverse    .      Plan:  Continue current care.      Lisa Gaspar MD  2017  5:09 PM  "

## 2017-12-21 NOTE — LACTATION NOTE
"This note was copied from a baby's chart.  Lactation visit complete. \"Reilly\" is a former 39  6/7 week gestation infant male delivered via C section 17 @ 0816am, birth weight 7 lbs 12.5 oz  ( 3530 grams) last weight 7 lbs 10.2 oz         ( 3465 grams) = 1.85% weight loss, infant had 2 urine, 4 stools, breast fed x3, received 17 ml EBM via syringe per mom choice, went to direct breast last at 1700 17. Mom states infant is tongue tired, is not transferring milk, ask for a breast pump, just wants to use breast pump and give EBM to infant via syringe until can find a Dr that will clip infants tongue. Mom states knows about breastfeeding, breast fed 1st child without difficulty. Offered to assist mom getting infant to breast, to evaluate latch, sometimes different positions can help with latch. Mom states tried different positioning to help with latch, tried nipple shield, all unsuccessful, just want to use breast pump until tongue can be fixed. Instructed mom on hands on pumping, supplementation with a breastfeeding friendly bottle/nipple, finger or spoon feeding EBM to infant. Instructed Instructed mom on not going home feeding infant with oral syringe, need a feeding plan.  Dad stated wants to supplement with formula until moms milk volume increases. Steamed Mams bottle from discharge bag gave to parents for supplementation of formula. Education completed on supplementing with formula, mom continuing to use breast pump to mimic baby, every 2 to 3 hours for 15 to 20 min, cleaning of breast pump parts, sterilization of breast pump parts, storage and transportation of EBM, Mom and Dad verbalized good understanding, denies any other questions or concerns at this time. Provided support and encouragement.      Maternal Hx: , BF 1st child 18 months, Asthma  Prenatal Medications: Albuterol, benedryl, Iron, PNV, zofran      Mom does not have breast pump for home use, requested RX be sent to [facility]  "

## 2017-12-21 NOTE — ANESTHESIA POSTPROCEDURE EVALUATION
Patient: Sherine Marques    Procedure Summary     Date Anesthesia Start Anesthesia Stop Room / Location    17 0750 [id number]  [facility] 1 /  [facility]       Procedure Diagnosis Surgeon Provider     SECTION REPEAT (N/A Abdomen) (PREVIOUS  DELIVERY) MD Pj Mireles CRNA          Anesthesia Type: spinal  Last vitals  BP   102/74 (17)   Temp   97.5 °F (36.4 °C) (17)   Pulse   91 (17)   Resp   16 (17)     SpO2   100 % (17)     Post Anesthesia Care and Evaluation    Anesthetic complications: No anesthetic complications  Post Neuraxial Block status: Motor and sensory function returned to baseline and No signs or symptoms of PDPH  Comments: Blood pressure 102/74, pulse 91, temperature 97.5 °F (36.4 °C), temperature source Temporal Artery , resp. rate 16, , SpO2 100 %,

## 2017-12-22 RX ADMIN — POLYETHYLENE GLYCOL (3350) 17 G: 17 POWDER, FOR SOLUTION ORAL at 09:01

## 2017-12-22 RX ADMIN — DOCUSATE SODIUM -SENNOSIDES 1 TABLET: 50; 8.6 TABLET, COATED ORAL at 22:00

## 2017-12-22 RX ADMIN — ONDANSETRON 4 MG: 4 TABLET, FILM COATED ORAL at 06:28

## 2017-12-22 RX ADMIN — PRENATAL VIT W/ FE FUMARATE-FA TAB 27-0.8 MG 1 TABLET: 27-0.8 TAB at 09:01

## 2017-12-22 RX ADMIN — OXYCODONE HYDROCHLORIDE AND ACETAMINOPHEN 2 TABLET: 5; 325 TABLET ORAL at 18:26

## 2017-12-22 RX ADMIN — IBUPROFEN 800 MG: 800 TABLET, FILM COATED ORAL at 09:01

## 2017-12-22 RX ADMIN — IBUPROFEN 800 MG: 800 TABLET, FILM COATED ORAL at 17:30

## 2017-12-22 RX ADMIN — OXYCODONE HYDROCHLORIDE AND ACETAMINOPHEN 1 TABLET: 5; 325 TABLET ORAL at 06:21

## 2017-12-22 RX ADMIN — ONDANSETRON 4 MG: 4 TABLET, FILM COATED ORAL at 18:26

## 2017-12-22 RX ADMIN — OXYCODONE HYDROCHLORIDE AND ACETAMINOPHEN 2 TABLET: 5; 325 TABLET ORAL at 12:24

## 2017-12-22 NOTE — PLAN OF CARE
Problem: Patient Care Overview (Adult)  Goal: Plan of Care Review  Outcome: Ongoing (interventions implemented as appropriate)    Goal: Adult Individualization and Mutuality  Outcome: Ongoing (interventions implemented as appropriate)    Goal: Discharge Needs Assessment  Outcome: Ongoing (interventions implemented as appropriate)      Problem: Postpartum ( Delivery) (Adult)  Goal: Signs and Symptoms of Listed Potential Problems Will be Absent or Manageable (Postpartum)  Outcome: Ongoing (interventions implemented as appropriate)

## 2017-12-22 NOTE — PLAN OF CARE
Problem: Patient Care Overview (Adult)  Goal: Plan of Care Review  Outcome: Ongoing (interventions implemented as appropriate)  Ambulated in david and around room. Continues to pump and give to baby what pumps. Tearry at times and nervous. Unsure of self. Reassured. Fundus firm midline at one below umbilicus. Passed 2 fist size clots today. Little dizzy this last time. Vital signs good. Knows to call for help if needs. Still due to get shower. Dressing dry and intact.Voiding quantity sufficient. Encouraged to drink.   17 1700   Coping/Psychosocial Response Interventions   Plan Of Care Reviewed With patient;significant other   Patient Care Overview   Progress progress toward functional goals as expected     Goal: Adult Individualization and Mutuality  Outcome: Ongoing (interventions implemented as appropriate)    Goal: Discharge Needs Assessment  Outcome: Ongoing (interventions implemented as appropriate)      Problem: Postpartum ( Delivery) (Adult)  Goal: Signs and Symptoms of Listed Potential Problems Will be Absent or Manageable (Postpartum)  Outcome: Ongoing (interventions implemented as appropriate)

## 2017-12-22 NOTE — PROGRESS NOTES
"[facility]   Section Postpartum Delivery Progress Note    Subjective   Postpartum Day 2:  Delivery    The patient feels well.  Her pain is well controlled with prescribed pain medications.   She is ambulating well.  Patient describes her bleeding as thin lochia.    Breastfeeding: without difficulty.    Objective     Vital Signs Range for the last 24 hours  Temperature: Temp:  [97.6 °F (36.4 °C)-98.2 °F (36.8 °C)] 98.2 °F (36.8 °C)   Temp Source: Temp src: Temporal Artery    BP: BP: (113-131)/(57-78) 116/76   Pulse: Heart Rate:  [] 98   Respirations: Resp:  [16-17] 16   SPO2: SpO2:  [97 %-99 %] 98 %   O2 Amount (l/min):     O2 Devices O2 Device: room air   Weight:       Admit Height:  Height: 157.5 cm (62\")      Physical Exam:  General:  no acute distresss.  Abdomen: Fundus: appropriate, firm, non tender  Extremities: normal, atraumatic, no cyanosis, and trace edema.   Incision: clean, dry & intact    Lab results reviewed:  Yes   Rubella:  No results found for: RUBELLAIGGIN Nurse Transcribed from prenatal record --  No components found for: EXTRUBELQUAL  Rh Status:    RH type   Date Value Ref Range Status   2017 Positive  Final     Immunizations: There is no immunization history for the selected administration types on file for this patient.    Assessment/Plan     Principal Problem:     delivery delivered  Active Problems:    Maternal care due to low transverse uterine scar from previous  delivery      Sherine Evans May is Day 2  post-partum  , Low Transverse    .      Plan:  Continue current care.      Lisa Gaspar MD  2017  12:18 PM  "

## 2017-12-22 NOTE — LACTATION NOTE
"This note was copied from a baby's chart.  Lactation visit completed. \"Reilly\" is a former 39  6/7 week gestation infant, delivered via c section 12/20/17 @ 0816am infant is day of life 2, birth weight 7 lbs 12.5 oz ( 3530 grams) last weight 7 lbs 8.5 oz  ( 3415 grams) = 3.26% weight loss. Infant had 1 void, 3 stools, Breast fed x1, took in 48.1 ml of EBM, No formula yesterday 12/21/17. Mom states has been pumping every 2 to3 hours, increasing in amount of EBM pumping the last 3 pumping sessions mom has pumped 20 ml. Mom states infant did not do well with breastfeeding, infant gets frustrated at breast. Reinforced education with mom on the importance of using a breastfeeding/Slow flow nipple when feeding infant via bottle. ( Nipple on colostrum container at bedside is regular flow, Breastfeeding friendly bottle/nipple cleaned and sterilized 12/21/17 given to mom with education on importance of using slow flow nipple when feeding infant via bottle.) Reinforced differences from direct breastfeeding and bottle feeding. Offered to assist mom with direct breastfeeding, mom declined. Wrote lactation number on folder at bedside table encouraged mom to call for assistance. Education completed on supply/demand, importance of breastfeeding or pumping every 3 hours around the clock, maternal nutrition/fluids/rest, adequate infant nutrition; voids and stools needed per day, things to avoid while breastfeeding, outpatient lactation clinic, breastfeeding support group, mom verbalized good understanding, denies any questions or concerns at this time. Breastfeeding A Great Start book given/Reviewed, see education. Provided mom with support and encouragement.   "

## 2017-12-22 NOTE — PLAN OF CARE
Problem: Patient Care Overview (Adult)  Goal: Plan of Care Review  Outcome: Ongoing (interventions implemented as appropriate)   17 0960   Coping/Psychosocial Response Interventions   Plan Of Care Reviewed With patient   Outcome Evaluation   Outcome Summary/Follow up Plan pt voiding, VSS, FF, ML UU. Pt is pumping and latching baby well. Pt is taking percocet and motrin for pain.      Goal: Adult Individualization and Mutuality  Outcome: Ongoing (interventions implemented as appropriate)    Goal: Discharge Needs Assessment  Outcome: Ongoing (interventions implemented as appropriate)      Problem: Postpartum ( Delivery) (Adult)  Goal: Signs and Symptoms of Listed Potential Problems Will be Absent or Manageable (Postpartum)  Outcome: Ongoing (interventions implemented as appropriate)

## 2017-12-23 VITALS
RESPIRATION RATE: 17 BRPM | OXYGEN SATURATION: 99 % | WEIGHT: 176.4 LBS | DIASTOLIC BLOOD PRESSURE: 65 MMHG | TEMPERATURE: 97.9 F | BODY MASS INDEX: 32.46 KG/M2 | HEIGHT: 62 IN | SYSTOLIC BLOOD PRESSURE: 123 MMHG | HEART RATE: 74 BPM

## 2017-12-23 PROBLEM — O34.211 MATERNAL CARE DUE TO LOW TRANSVERSE UTERINE SCAR FROM PREVIOUS CESAREAN DELIVERY: Status: RESOLVED | Noted: 2017-12-20 | Resolved: 2017-12-23

## 2017-12-23 PROCEDURE — 94799 UNLISTED PULMONARY SVC/PX: CPT

## 2017-12-23 RX ORDER — IBUPROFEN 800 MG/1
800 TABLET ORAL EVERY 8 HOURS PRN
Qty: 90 TABLET | Refills: 2 | Status: SHIPPED | OUTPATIENT
Start: 2017-12-23

## 2017-12-23 RX ORDER — OXYCODONE HYDROCHLORIDE AND ACETAMINOPHEN 5; 325 MG/1; MG/1
1 TABLET ORAL EVERY 6 HOURS PRN
Qty: 50 TABLET | Refills: 0 | Status: SHIPPED | OUTPATIENT
Start: 2017-12-23 | End: 2017-12-31

## 2017-12-23 RX ADMIN — IBUPROFEN 800 MG: 800 TABLET, FILM COATED ORAL at 11:47

## 2017-12-23 RX ADMIN — PRENATAL VIT W/ FE FUMARATE-FA TAB 27-0.8 MG 1 TABLET: 27-0.8 TAB at 08:08

## 2017-12-23 RX ADMIN — MILK OF MAGNESIA 10 ML: 2400 CONCENTRATE ORAL at 06:33

## 2017-12-23 RX ADMIN — OXYCODONE HYDROCHLORIDE AND ACETAMINOPHEN 2 TABLET: 5; 325 TABLET ORAL at 00:34

## 2017-12-23 RX ADMIN — IBUPROFEN 800 MG: 800 TABLET, FILM COATED ORAL at 01:46

## 2017-12-23 RX ADMIN — OXYCODONE HYDROCHLORIDE AND ACETAMINOPHEN 2 TABLET: 5; 325 TABLET ORAL at 11:47

## 2017-12-23 RX ADMIN — POLYETHYLENE GLYCOL (3350) 17 G: 17 POWDER, FOR SOLUTION ORAL at 08:08

## 2017-12-23 RX ADMIN — OXYCODONE HYDROCHLORIDE AND ACETAMINOPHEN 2 TABLET: 5; 325 TABLET ORAL at 06:34

## 2017-12-23 RX ADMIN — ONDANSETRON 4 MG: 4 TABLET, FILM COATED ORAL at 07:32

## 2017-12-23 NOTE — DISCHARGE SUMMARY
Discharge Summary     Preet Evans May  : 1995  MRN: [id number]  CSN: [id number]    Date of Admission: 2017   Date of Discharge:  2017   Delivering Physician: Lisa Gaspar        Admission Diagnosis: 1. Maternal care due to low transverse uterine scar from previous  delivery [O34.211]  2.  delivery delivered [O82]  3. Maternal care due to low transverse uterine scar from previous  delivery [O34.211]   Discharge Diagnosis: 1. Pregnancy at 39w6d - delivered       Procedures: 2017  - , Low Transverse       Hospital Course  Patient is a 22 y.o.  who at 39w6d had a  section.  Her postoperative course was without complications.  On POD #2 she was ready for discharge.  She had normal lochia and pain well controlled with oral medications.    Infant  male  fetus weighing 3530 g (7 lb 12.5 oz)   Apgars -  8  @ 1 minute /  9  @ 5 minutes.    Discharge labs  Lab Results   Component Value Date    WBC 14.18 (H) 2017    HGB 8.1 (L) 2017    HCT 24.6 (L) 2017     2017       Discharge Medications   Sherine Marques   Home Medication Instructions ELAINE:[id number]    Printed on:17 0805   Medication Information                      albuterol (PROVENTIL HFA;VENTOLIN HFA) 108 (90 Base) MCG/ACT inhaler  Inhale 2 puffs Every 4 (Four) Hours As Needed for Wheezing.             diphenhydrAMINE (BENADRYL) 25 mg capsule  Take 25 mg by mouth Every 6 (Six) Hours As Needed for Itching or Allergies (may take twice a week).             ferrous sulfate 325 (65 FE) MG tablet  Take 325 mg by mouth 3 (Three) Times a Day With Meals.             ondansetron (ZOFRAN) 4 MG tablet  Take 4 mg by mouth Every 8 (Eight) Hours As Needed for Nausea or Vomiting.             Prenatal Multivit-Min-Fe-FA (PRENATAL VITAMINS PO)  Take 1 tablet by mouth Daily.                 Discharge Disposition    Condition on Discharge: good    Follow-up: 2 weeks with MD Lisa Mireles MD  12/23/2017

## 2017-12-23 NOTE — PROGRESS NOTES
"[facility]   Section Postpartum Delivery Progress Note    Subjective   Postpartum Day 3:  Delivery    The patient feels well.  Her pain is well controlled with prescribed pain medications.   She is ambulating well.  Patient describes her bleeding as no bleeding.    Breastfeeding: without difficulty.    Objective     Vital Signs Range for the last 24 hours  Temperature: Temp:  [97.3 °F (36.3 °C)-98.2 °F (36.8 °C)] 98 °F (36.7 °C)   Temp Source: Temp src: Temporal Artery    BP: BP: (116-127)/(52-78) 121/64   Pulse: Heart Rate:  [86-98] 91   Respirations: Resp:  [16-18] 16   SPO2: SpO2:  [97 %-100 %] 99 %   O2 Amount (l/min):     O2 Devices O2 Device: room air   Weight:       Admit Height:  Height: 157.5 cm (62\")      Physical Exam:  General:  no acute distresss.  Abdomen: Fundus: appropriate, firm, non tender  Extremities: normal, atraumatic, no cyanosis, and trace edema.   Incision: clean, dry & intact    Lab results reviewed:  Yes   Rubella:  No results found for: RUBELLAIGGIN Nurse Transcribed from prenatal record --  No components found for: EXTRUBELQUAL  Rh Status:  No results found for: RH  Immunizations: There is no immunization history for the selected administration types on file for this patient.    Assessment/Plan     Principal Problem:     delivery delivered  Active Problems:    Maternal care due to low transverse uterine scar from previous  delivery      Sherine Evans May is Day 3  post-partum  , Low Transverse    .      Plan:  Discharge home with standard precautions and return to clinic in 4-6 weeks.      Lisa Gaspar MD  2017  8:05 AM  "

## 2017-12-23 NOTE — PLAN OF CARE
Problem: Patient Care Overview (Adult)  Goal: Plan of Care Review  Outcome: Ongoing (interventions implemented as appropriate)   17 0634   Coping/Psychosocial Response Interventions   Plan Of Care Reviewed With patient   Patient Care Overview   Progress improving   Outcome Evaluation   Outcome Summary/Follow up Plan VSS, pain well controlled with p.o. pain meds, FF, ML, UU with scant lochia, breastfeeding & bonding well     Goal: Adult Individualization and Mutuality  Outcome: Ongoing (interventions implemented as appropriate)    Goal: Discharge Needs Assessment  Outcome: Ongoing (interventions implemented as appropriate)      Problem: Postpartum ( Delivery) (Adult)  Goal: Signs and Symptoms of Listed Potential Problems Will be Absent or Manageable (Postpartum)  Outcome: Ongoing (interventions implemented as appropriate)

## 2017-12-25 ENCOUNTER — DOCUMENTATION (OUTPATIENT)
Dept: LACTATION | Facility: HOSPITAL | Age: 22
End: 2017-12-25

## 2017-12-26 NOTE — PAYOR COMM NOTE
"ND HOME 17    [id number]  Ginna Gan (22 y.o. Female)     Date of Birth Social Security Number Address [address] Phone MRN    1995  [address] [place] [address] [phone] [id number]    [place] Marital Status          None Significant Other       Admission Date Admission Type Admitting Provider Attending Provider Department, Room/Bed    17 Elective Lisa Gaspar MD  [facility] MOTHER BABY 2A, M242/1    Discharge Date Discharge Disposition Discharge Destination        2017 Home or Self Care             Attending Provider: (none)    Allergies:  No Known Allergies    Isolation:  None   Infection:  None   Code Status:  Prior    Ht:  157.5 cm (62\")   Wt:  80 kg (176 lb 6.4 oz)    Admission Cmt:  None   Principal Problem:   delivery delivered [O82]                 Active Insurance as of 2017     Primary Coverage     Payor Plan Insurance Group Employer/Plan Group    [employer]HE[employer] BLUE [employer]OSS AN[employer] BLUE CROSS BLUE SHIELD PPO [id number]     Payor Plan Address Payor Plan Phone Number Effective From Effective To    [address] [phone] 2016     [place] [address]       Subscriber Name Subscriber Birth Date Member ID       PHILL GAN 1962 [id number]           Secondary Coverage     Payor Plan Insurance Group Employer/Plan Group    [employer] [place] [employer] [place]ICA[place]      Payor Plan Address Payor Plan Phone Number Effective From Effective To    [address] [address] [phone] 2017     [place] [address]       Subscriber Name Subscriber Birth Date Member ID       GINNA GAN 1995 [id number]                 Emergency Contacts      (Rel.) Home Phone Work Phone Mobile Phone    Kin Gan (Spouse) [phone] -- --               Discharge Summary      Lisa Gaspar MD at 2017  8:05 AM          Discharge Summary     Preet Evans [birth year]  : 1995  MRN: [id number]  CSN: [id number]    Date " of Admission: 2017   Date of Discharge:  2017   Delivering Physician: Lisa Gaspar        Admission Diagnosis: 1. Maternal care due to low transverse uterine scar from previous  delivery [O34.211]  2.  delivery delivered [O82]  3. Maternal care due to low transverse uterine scar from previous  delivery [O34.211]   Discharge Diagnosis: 1. Pregnancy at 39w6d - delivered       Procedures: 2017  - , Low Transverse       Hospital Course  Patient is a 22 y.o.  who at 39w6d had a  section.  Her postoperative course was without complications.  On POD #2 she was ready for discharge.  She had normal lochia and pain well controlled with oral medications.    Infant  male  fetus weighing 3530 g (7 lb 12.5 oz)   Apgars -  8  @ 1 minute /  9  @ 5 minutes.    Discharge labs  Lab Results   Component Value Date    WBC 14.18 (H) 2017    HGB 8.1 (L) 2017    HCT 24.6 (L) 2017     2017       Discharge Medications   May, Sherinehudson Evans   Home Medication Instructions ELAINE:[id number]    Printed on:17 0805   Medication Information                      albuterol (PROVENTIL HFA;VENTOLIN HFA) 108 (90 Base) MCG/ACT inhaler  Inhale 2 puffs Every 4 (Four) Hours As Needed for Wheezing.             diphenhydrAMINE (BENADRYL) 25 mg capsule  Take 25 mg by mouth Every 6 (Six) Hours As Needed for Itching or Allergies (may take twice a week).             ferrous sulfate 325 (65 FE) MG tablet  Take 325 mg by mouth 3 (Three) Times a Day With Meals.             ondansetron (ZOFRAN) 4 MG tablet  Take 4 mg by mouth Every 8 (Eight) Hours As Needed for Nausea or Vomiting.             Prenatal Multivit-Min-Fe-FA (PRENATAL VITAMINS PO)  Take 1 tablet by mouth Daily.                 Discharge Disposition    Condition on Discharge: good   Follow-up: 2 weeks with MD Lisa Mireles MD  2017     Electronically  signed by Lisa Gaspar MD at 12/23/2017  8:05 AM

## 2017-12-27 LAB
CYTO UR: NORMAL
LAB AP CASE REPORT: NORMAL
LAB AP CLINICAL INFORMATION: NORMAL
Lab: NORMAL
PATH REPORT.FINAL DX SPEC: NORMAL
PATH REPORT.GROSS SPEC: NORMAL

## 2018-01-09 ENCOUNTER — TELEPHONE (OUTPATIENT)
Dept: OTHER | Facility: HOSPITAL | Age: 23
End: 2018-01-09

## 2018-01-09 NOTE — TELEPHONE ENCOUNTER
"Infant, boy, 3 wks    Sherine called as has a large supply of stored breast milk. She says she was \"an over-\" with first child and much of her stored milk went to waste. She desires to donate her milk even though infant is still very young. Referred her to [facility] ([facility] [employer] [facility]) website. Informed her that our nearest bank is in [place], [place], but there are also milk depots where milk can be sent. Encouraged her to coordinate with [employer]. She was appreciative.    She also plans to attend [employer] and desired to be placed on our mailing list for reminders monthly. Much encouragement to cont BFing given. She nursed first child for 18 months.   "

## (undated) DEVICE — Device

## (undated) DEVICE — GLV SURG TRIUMPH ORTHO W/ALOE PF LTX 7.0

## (undated) DEVICE — DRSNG BRDR MEPILEX P/OP SIL 4X12IN

## (undated) DEVICE — ADHS LIQ MASTISOL 2/3ML

## (undated) DEVICE — SUT VIC 0 CT1 36IN J946H

## (undated) DEVICE — APPL CHLORAPREP W/TINT 26ML ORNG

## (undated) DEVICE — SLV SCD KN ADJ EXPRSS LG

## (undated) DEVICE — 3M™ STERI-STRIP™ REINFORCED ADHESIVE SKIN CLOSURES, R1547, 1/2 IN X 4 IN (12 MM X 100 MM), 6 STRIPS/ENVELOPE: Brand: 3M™ STERI-STRIP™